# Patient Record
Sex: MALE | Race: WHITE | NOT HISPANIC OR LATINO
[De-identification: names, ages, dates, MRNs, and addresses within clinical notes are randomized per-mention and may not be internally consistent; named-entity substitution may affect disease eponyms.]

---

## 2022-06-29 PROBLEM — Z00.00 ENCOUNTER FOR PREVENTIVE HEALTH EXAMINATION: Status: ACTIVE | Noted: 2022-06-29

## 2022-07-06 ENCOUNTER — NON-APPOINTMENT (OUTPATIENT)
Age: 52
End: 2022-07-06

## 2022-07-06 ENCOUNTER — APPOINTMENT (OUTPATIENT)
Dept: HEART AND VASCULAR | Facility: CLINIC | Age: 52
End: 2022-07-06

## 2022-07-06 VITALS
HEART RATE: 80 BPM | BODY MASS INDEX: 32.62 KG/M2 | SYSTOLIC BLOOD PRESSURE: 114 MMHG | RESPIRATION RATE: 12 BRPM | DIASTOLIC BLOOD PRESSURE: 80 MMHG | HEIGHT: 71 IN | WEIGHT: 233 LBS

## 2022-07-06 DIAGNOSIS — R94.31 ABNORMAL ELECTROCARDIOGRAM [ECG] [EKG]: ICD-10-CM

## 2022-07-06 DIAGNOSIS — Z86.79 PERSONAL HISTORY OF OTHER DISEASES OF THE CIRCULATORY SYSTEM: ICD-10-CM

## 2022-07-06 DIAGNOSIS — R01.1 CARDIAC MURMUR, UNSPECIFIED: ICD-10-CM

## 2022-07-06 PROCEDURE — 93000 ELECTROCARDIOGRAM COMPLETE: CPT

## 2022-07-06 PROCEDURE — 99204 OFFICE O/P NEW MOD 45 MIN: CPT

## 2022-07-06 PROCEDURE — 93306 TTE W/DOPPLER COMPLETE: CPT

## 2022-07-06 RX ORDER — ENALAPRIL MALEATE AND HYDROCHLOROTHIAZIDE 10; 25 MG/1; MG/1
10-25 TABLET ORAL
Qty: 30 | Refills: 0 | Status: ACTIVE | COMMUNITY
Start: 2022-06-15

## 2022-07-06 NOTE — PHYSICAL EXAM

## 2022-07-06 NOTE — HISTORY OF PRESENT ILLNESS
[FreeTextEntry1] : 52-year-old male with no significant past medical history comes in for evaluation of an abnormal EKG. Patient went to his PCP for routine visit was told EKG is abnormal and to follow up with a cardiologist. Overall, he feels well denies any chest pain shortness of breath PND or orthopnea. Exercises regularly without issues.

## 2022-07-06 NOTE — DISCUSSION/SUMMARY
[FreeTextEntry1] : 1. Abnormal ECG:  EKG here today is normal sinus rhythm incomplete right bundle branch block within normal limits. Similar to tracing from PCPs office. As patient does exercise routinely no ischemic workup necessary at this time.\par 2. Cardiac murmur: Echocardiogram \par 3. Hypertension: Continue enalapril-hydrochlorothiazide maintain a low salt diet weight loss.